# Patient Record
Sex: MALE | Race: WHITE | Employment: UNEMPLOYED | URBAN - METROPOLITAN AREA
[De-identification: names, ages, dates, MRNs, and addresses within clinical notes are randomized per-mention and may not be internally consistent; named-entity substitution may affect disease eponyms.]

---
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
## 2018-08-16 ENCOUNTER — OFFICE VISIT (OUTPATIENT)
Dept: FAMILY MEDICINE CLINIC | Age: 18
End: 2018-08-16

## 2018-08-16 VITALS
TEMPERATURE: 98.8 F | DIASTOLIC BLOOD PRESSURE: 63 MMHG | RESPIRATION RATE: 16 BRPM | HEIGHT: 62 IN | OXYGEN SATURATION: 100 % | HEART RATE: 86 BPM | SYSTOLIC BLOOD PRESSURE: 112 MMHG

## 2018-08-16 DIAGNOSIS — F82 MOTOR DEVELOPMENTAL DELAY: ICD-10-CM

## 2018-08-16 DIAGNOSIS — Q05.2 SPINA BIFIDA OF LUMBAR REGION WITH HYDROCEPHALUS (HCC): ICD-10-CM

## 2018-08-16 DIAGNOSIS — G82.20 PARAPARESIS (HCC): ICD-10-CM

## 2018-08-16 DIAGNOSIS — N31.9 NEUROGENIC BLADDER: ICD-10-CM

## 2018-08-16 DIAGNOSIS — Z98.2 VP (VENTRICULOPERITONEAL) SHUNT STATUS: ICD-10-CM

## 2018-08-16 DIAGNOSIS — R41.844 EXECUTIVE FUNCTION DEFICIT: ICD-10-CM

## 2018-08-16 DIAGNOSIS — F89 NEURODEVELOPMENTAL DISORDER: Primary | ICD-10-CM

## 2018-08-16 DIAGNOSIS — R41.3 MEMORY PROBLEM: ICD-10-CM

## 2018-08-16 RX ORDER — TOLTERODINE 2 MG/1
CAPSULE, EXTENDED RELEASE ORAL
COMMUNITY
Start: 2018-08-15 | End: 2020-02-04

## 2018-08-16 NOTE — PROGRESS NOTES
Kushal Yanez [address], [address]  [phone]             Date of visit: 8/16/2018   Subjective:      History obtained from:  Patient and mother  . Analia Wagner is a 19 y.o. male who presents today for just needs form filled out, see scanned  Going to community college and asking for academic accommodations  Had neuropsych testing in 2014, see scanned  Recommended various help for his schooling    Dx neurodevelopmental disorder, fine motor delay, memory problem, executive functioning problem. When he took SAT had 1.5x and someone reading the exam aloud  Does think he does better with auditory than reading  Really motivated to lean and work hard  Still playing 3 sports (basketball, rowing, lacrosse) for special olympics but not needing form for that  Feeling well overall, no new concerns.   Has not had emotional problems now or in the past    PHQ over the last two weeks 8/16/2018   Little interest or pleasure in doing things Not at all   Feeling down, depressed, irritable, or hopeless Not at all   Total Score PHQ 2 0        Patient Active Problem List    Diagnosis Date Noted    Neurodevelopmental disorder 08/16/2018    Motor developmental delay 08/16/2018    Memory problem 08/16/2018    Executive function deficit 08/16/2018    Paraparesis ([facility] [address] 07/28/2017    Neurogenic bladder 07/28/2017    Urinary incontinence 07/28/2017    Self-catheterizes urinary bladder 07/28/2017    Flexion contracture of knee 07/28/2017     (ventriculoperitoneal) shunt status 07/28/2017    Vitamin D deficiency 07/28/2017    Mild intermittent asthma without complication 56/24/6256    Spina bifida ([facility] [address] 08/05/2014     Current Outpatient Prescriptions   Medication Sig Dispense Refill    tolterodine ER (DETROL-LA) 2 mg ER capsule       albuterol (PROVENTIL HFA, VENTOLIN HFA, PROAIR HFA) 90 mcg/actuation inhaler 2 Puffs Q4 hours scheduled x 48 hours then Q4 hours PRN 1 Inhaler 3    budesonide (PULMICORT) 180 mcg/actuation aepb inhaler Take 2 Puffs by inhalation two (2) times a day. 1 Each 2    pedi multivitamin no. 79-iron (FLINTSTONES WITH IRON) 18 mg iron chew Take 2 Tabs by mouth daily. 100 Tab 3    oxybutynin (OXYTROL) 3.9 mg/24 hr transdermal patch 1 Patch by TransDERmal route two (2) days a week. Allergies   Allergen Reactions    Latex Hives    Banana Other (comments)     ASSUMED DUE TO LATEX ALLERGY    Kiwi Other (comments)     ASSUMED DUE TO LATEX ALLERGY    [place] Other (comments)     ASSUMED DUE TO LATEX ALLERGY     Past Medical History:   Diagnosis Date    Asthma attack 4/29/2016    Chiari malformation     Hydrocephalus     Ill-defined condition     spina bifida    Spina bifida ([facility] [address]      Past Surgical History:   Procedure Laterality Date    HX BACK SURGERY  LAST: 2006  3 SURGERIES    TETHERED CORD RELEASE    HX CSF SHUNT Left 2010    has had at least 12    HX ORTHOPAEDIC      bilateral knee flexion deformity release    HX SPINAL CORD DECOMPRESSION N/A 2004    Neck     Family History   Problem Relation Age of Onset    Cancer Paternal Grandfather     Thyroid Disease Mother 46     5/2018    Hypertension Father     Elevated Lipids Father     Anesth Problems Neg Hx      Social History   Substance Use Topics    Smoking status: Never Smoker    Smokeless tobacco: Never Used    Alcohol use No      Social History     Social History Narrative    Lives with mom and older brother, who also has spina bifida    Mom homeschooled his last few years; he earned high school diploma             Objective:     Vitals:    08/16/18 1524   BP: 112/63   Pulse: 86   Resp: 16   Temp: 98.8 °F (37.1 °C)   TempSrc: Oral   SpO2: 100%   Height: 5' 2\" (1.575 m)     Did not weigh patient today; no suitable scale for wheelchair  Appears healthy not overweight.     General: stated age, appears well, in wheelchair with atrophy of leg muscles  Skin:  No lesions noted  Psych: alert and oriented to person, place, time and situation and Speech: appropriate quality, quantity and organization of sentences. Normal affect      Assessment/Plan:       ICD-10-CM ICD-9-CM    1. Neurodevelopmental disorder F89 315.9    2. Executive function deficit R41.844 799.55    3. Memory problem R41.3 780.93    4. Motor developmental delay F82 315.4    5. Spina bifida of lumbar region with hydrocephalus ([facility]) Q05.2 741.03    6. Paraparesis ([facility]) G82.20 344.1    7. Neurogenic bladder N31.9 596.54    8.  (ventriculoperitoneal) shunt status Z98.2 V45.2         Orders Placed This Encounter    tolterodine ER (DETROL-LA) 2 mg ER capsule     Filled out forms, see scanned  Very impressed he is going to college; will be extra challenges for him, but he is up for them  Asked them to let me know if he needs letter for other/more accommodations. Filled out form based on recommendations from neuropsych testing    Discussed the diagnosis and plan and he expressed understanding. Follow-up Disposition:  Return in about 1 year (around 8/16/2019).     Julianna Runner, MD

## 2018-08-16 NOTE — PROGRESS NOTES
Chief Complaint   Patient presents with    Complete Physical     school physical - ARISTIDES Merino      1. Have you been to the ER, urgent care clinic since your last visit? Hospitalized since your last visit? No    2. Have you seen or consulted any other health care providers outside of the [address] [place] [address] since your last visit? Include any pap smears or colon screening.    Yes Dr Fredrick Hilliard Neuro          Dr Ashley Hadley - Urologist

## 2018-10-16 ENCOUNTER — PATIENT MESSAGE (OUTPATIENT)
Dept: FAMILY MEDICINE CLINIC | Age: 18
End: 2018-10-16

## 2018-10-16 DIAGNOSIS — N39.490 OVERFLOW INCONTINENCE OF URINE: ICD-10-CM

## 2018-10-16 DIAGNOSIS — Z78.9 SELF-CATHETERIZES URINARY BLADDER: ICD-10-CM

## 2018-10-16 DIAGNOSIS — N31.9 NEUROGENIC BLADDER: ICD-10-CM

## 2018-10-16 DIAGNOSIS — R41.844 EXECUTIVE FUNCTION DEFICIT: ICD-10-CM

## 2018-10-16 DIAGNOSIS — Z98.2 VP (VENTRICULOPERITONEAL) SHUNT STATUS: ICD-10-CM

## 2018-10-16 DIAGNOSIS — R41.3 MEMORY PROBLEM: ICD-10-CM

## 2018-10-16 DIAGNOSIS — F89 NEURODEVELOPMENTAL DISORDER: ICD-10-CM

## 2018-10-16 DIAGNOSIS — G82.20 PARAPARESIS (HCC): ICD-10-CM

## 2018-10-16 DIAGNOSIS — M24.569 FLEXION CONTRACTURE OF KNEE, UNSPECIFIED LATERALITY: ICD-10-CM

## 2018-10-16 DIAGNOSIS — F82 MOTOR DEVELOPMENTAL DELAY: ICD-10-CM

## 2018-10-16 DIAGNOSIS — Q05.2 SPINA BIFIDA OF LUMBAR REGION WITH HYDROCEPHALUS (HCC): Primary | ICD-10-CM

## 2018-10-16 NOTE — LETTER
NOTIFICATION  
10/16/2018 Mr. Waldemar Ibanez [facility] [address]  [address] [phone] To Whom It May Concern: 
 
Waldemar Ibanez is currently under the care of BARRON Jacobs 53. He has the following health conditions: ICD-10-CM ICD-9-CM 1. Spina bifida of lumbar region with hydrocephalus ([facility] [address] Q05.2 741.03   
2. Paraparesis ([facility]) G82.20 344.1 3. Neurogenic bladder N31.9 596.54   
4. Self-catheterizes urinary bladder Z78.9 V49.89   
5. Overflow incontinence of urine N39.490 788.38   
6.  (ventriculoperitoneal) shunt status Z98.2 V45.2 7. Flexion contracture of knee, unspecified laterality M24.569 718.46   
8. Neurodevelopmental disorder F89 315.9 9. Motor developmental delay F82 315.4 10. Memory problem R41.3 780.93   
11. Executive function deficit R41.844 799.55 He has lower extremity disability and is limited to a wheelchair, as he can barely move his lower extremities due to severe weakness from spina bifida. He has a mild memory/executive function problem for which he needs accommodations at school. He has to self-catheterize several times daily due to neurogenic bladder. If there are questions or concerns please contact our office. Sincerely, Mathew Street MD

## 2018-10-16 NOTE — TELEPHONE ENCOUNTER
Dr. Roberth Dominguez to go about this in such a manner. Just thought it would be easiest.     Chelsie Bhatti is filling out a juan application for [email or web address] in order to help with travel expenses for basketball, lacrosse and rowing. He needs a letter of medical verification that states he has lower limb disability.  This letter/document must include a diagnosis of your physical disability by a medical professional. The document should indicate your physical limitations as a result of a medical diagnosis. Is this something you could you please do for him and send to me? (I don't think he has mychart)    Thanks so much!!  Myron Byrd wrote her back that I would mail the letter.  Shanita Her MD 10/16/2018 7:37 AM

## 2018-11-01 ENCOUNTER — OFFICE VISIT (OUTPATIENT)
Dept: FAMILY MEDICINE CLINIC | Age: 18
End: 2018-11-01

## 2018-11-01 VITALS
HEIGHT: 62 IN | TEMPERATURE: 98.7 F | OXYGEN SATURATION: 98 % | HEART RATE: 83 BPM | RESPIRATION RATE: 18 BRPM | SYSTOLIC BLOOD PRESSURE: 120 MMHG | DIASTOLIC BLOOD PRESSURE: 68 MMHG

## 2018-11-01 DIAGNOSIS — R06.2 WHEEZING: ICD-10-CM

## 2018-11-01 DIAGNOSIS — J45.20 MILD INTERMITTENT ASTHMA WITHOUT COMPLICATION: ICD-10-CM

## 2018-11-01 RX ORDER — ALBUTEROL SULFATE 90 UG/1
AEROSOL, METERED RESPIRATORY (INHALATION)
Qty: 1 INHALER | Refills: 3 | Status: SHIPPED | OUTPATIENT
Start: 2018-11-01 | End: 2022-09-22 | Stop reason: SDUPTHER

## 2018-11-01 NOTE — PROGRESS NOTES
[facility]ushal Yanez [facility]  [id number] [place] [address] [address] [place]  [phone]             Date of visit: 11/1/2018   Subjective:      History obtained from:  Patient, mother. Arti Kaba is a 19 y.o. male who presents today for asthma worse in past month  Needing rescue inhaler sometimes during basketball practice, sometimes feels like he needs it  No sob, just wheezing, cough. The albuterol helps  Only using his pulmicort once daily    Hasn't had a cold or allergies  When he was younger cold weather would bring it on  Thinks this started when the weather got cold  Not exposed to smoke      Asthma started around age 1  They don't recall him having PFTs  Sort of outgrew it for a while, has come back but not as bad as it used to be    Patient Active Problem List    Diagnosis Date Noted    Neurodevelopmental disorder 08/16/2018    Motor developmental delay 08/16/2018    Memory problem 08/16/2018    Executive function deficit 08/16/2018    Paraparesis ([facility] [address] 07/28/2017    Neurogenic bladder 07/28/2017    Urinary incontinence 07/28/2017    Self-catheterizes urinary bladder 07/28/2017    Flexion contracture of knee 07/28/2017     (ventriculoperitoneal) shunt status 07/28/2017    Vitamin D deficiency 07/28/2017    Mild intermittent asthma without complication 68/84/4124    Spina bifida ([facility] [address] 08/05/2014     Current Outpatient Medications   Medication Sig Dispense Refill    albuterol (PROVENTIL HFA, VENTOLIN HFA, PROAIR HFA) 90 mcg/actuation inhaler 2 Puffs Q4 hours scheduled x 48 hours then Q4 hours PRN 1 Inhaler 3    tolterodine ER (DETROL-LA) 2 mg ER capsule       budesonide (PULMICORT) 180 mcg/actuation aepb inhaler Take 2 Puffs by inhalation two (2) times a day.  1 Each 2     Allergies   Allergen Reactions    Latex Hives    Banana Other (comments)     ASSUMED DUE TO LATEX ALLERGY    Kiwi Other (comments)     ASSUMED DUE TO LATEX ALLERGY    [place] Other (comments) ASSUMED DUE TO LATEX ALLERGY     Past Medical History:   Diagnosis Date    Asthma attack 4/29/2016    Chiari malformation     Hydrocephalus     Ill-defined condition     spina bifida    Spina bifida ([facility] [address]      Past Surgical History:   Procedure Laterality Date    HX BACK SURGERY  LAST: 2006  3 SURGERIES    TETHERED CORD RELEASE    HX CSF SHUNT Left 2010    has had at least 12    HX ORTHOPAEDIC      bilateral knee flexion deformity release    HX SPINAL CORD DECOMPRESSION N/A 2004    Neck     Family History   Problem Relation Age of Onset    Cancer Paternal Grandfather     Thyroid Disease Mother 46        5/2018    Hypertension Father     Elevated Lipids Father     Anesth Problems Neg Hx      Social History     Tobacco Use    Smoking status: Never Smoker    Smokeless tobacco: Never Used   Substance Use Topics    Alcohol use: No      Social History     Social History Narrative    Lives with mom and older brother, who also has spina bifida    Mom homeschooled his last few years; he earned high school diploma        Review of Systems  Gen: denies fever  ENT denies runny nose  All: denies itchy eyes         Objective:     Vitals:    11/01/18 1532   BP: 120/68   Pulse: 83   Resp: 18   Temp: 98.7 °F (37.1 °C)   SpO2: 98%   Height: 5' 2\" (1.575 m)     There is no height or weight on file to calculate BMI.      General: stated age, well nourished, and in NAD, in wheelchair  Neck: supple, symmetrical, trachea midline, no adenopathy and thyroid: not enlarged, symmetric, no tenderness/mass/nodules  Nose: no drainage, turbinates normal  Throat: clear, no tonsillar exudate or erthema  Mouth: no lesions noted  Lungs:  clear to auscultation w/o rales, rhonchi, wheezes w/normal effort and no use of accessory muscles of respiration   Heart: regular rate and rhythm, S1, S2 normal, no murmur, click, rub or gallop  Lymph: no cervical adenopathy appreciated  Ext: no edema noted, in braces  Skin:  No rashes or lesions seen    Assessment/Plan:       ICD-10-CM ICD-9-CM    1. Wheezing R06.2 786.07 albuterol (PROVENTIL HFA, VENTOLIN HFA, PROAIR HFA) 90 mcg/actuation inhaler   2. Mild intermittent asthma without complication P00.92 403.71 albuterol (PROVENTIL HFA, VENTOLIN HFA, PROAIR HFA) 90 mcg/actuation inhaler        Orders Placed This Encounter    albuterol (PROVENTIL HFA, VENTOLIN HFA, PROAIR HFA) 90 mcg/actuation inhaler       Advised increase pulmicort to BID during winter months, he knows to rinse mouth  Use albuterol every 4 hours for a few days until pulmicort kicks in  If not much better in a week will do PFTs  Doesn't really seem exacerbated bad enough to do oral steroids, he just typically has these symtpoms in the colder months  If doing well when warmer again can go back to once daily pulmicort  Advised to avoid smoke  Use hand  to prevent URIs  Declines vaccines    Discussed the diagnosis and plan and he expressed understanding. Follow-up Disposition:  Return if symptoms worsen or fail to improve.     Bill Victor MD

## 2018-11-01 NOTE — PROGRESS NOTES
Chief Complaint   Patient presents with    Asthma     follow up, states has not been under good control. Unable to weigh. Reviewed Record in preparation for visit and have obtained necessary documentation. Identified pt with two pt identifiers (Name @ )    Health Maintenance Due   Topic    HPV Age 9Y-34Y (1 - Male 3-dose series)    Pneumococcal 19-64 Medium Risk (1 of 1 - PPSV23)         1. Have you been to the ER, urgent care clinic since your last visit? Hospitalized since your last visit? No    2. Have you seen or consulted any other health care providers outside of the [address] [place] [address] since your last visit? Include any pap smears or colon screening.  No

## 2018-11-01 NOTE — PATIENT INSTRUCTIONS
Asthma in Adults: Care Instructions  Your Care Instructions    During an asthma attack, your airways swell and narrow as a reaction to certain things (triggers). This makes it hard to breathe. You may be able to prevent asthma attacks if you avoid the things that set off your asthma symptoms. Keeping your asthma under control and treating symptoms before they get bad can help you avoid severe attacks. If you can control your asthma, you may be able to do all of your normal daily activities. You may also avoid asthma attacks and trips to the hospital.  Follow-up care is a key part of your treatment and safety. Be sure to make and go to all appointments, and call your doctor if you are having problems. It's also a good idea to know your test results and keep a list of the medicines you take. How can you care for yourself at home? · Follow your asthma action plan so you can manage your symptoms at home. An asthma action plan will help you prevent and control airway reactions and will tell you what to do during an asthma attack. If you do not have an asthma action plan, work with your doctor to build one. · Take your asthma medicine exactly as prescribed. Medicine plays an important role in controlling asthma. Talk to your doctor right away if you have any questions about what to take and how to take it. ? Use your quick-relief medicine when you have symptoms of an attack. Quick-relief medicine often is an albuterol inhaler. Some people need to use quick-relief medicine before they exercise. ? Take your controller medicine every day, not just when you have symptoms. Controller medicine is usually an inhaled corticosteroid. The goal is to prevent problems before they occur. Do not use your controller medicine to try to treat an attack that has already started. It does not work fast enough to help. ? If your doctor prescribed corticosteroid pills to use during an attack, take them as directed.  They may take hours to work, but they may shorten the attack and help you breathe better. ? Keep your quick-relief medicine with you at all times. · Talk to your doctor before using other medicines. Some medicines, such as aspirin, can cause asthma attacks in some people. · Check yourself for asthma symptoms to know which step to follow in your action plan. Watch for things like being short of breath, having chest tightness, coughing, and wheezing. Also notice if symptoms wake you up at night or if you get tired quickly when you exercise. · If you have a peak flow meter, use it to check how well you are breathing. This can help you predict when an asthma attack is going to occur. Then you can take medicine to prevent the asthma attack or make it less severe. · See your doctor regularly. These visits will help you learn more about asthma and what you can do to control it. Your doctor will monitor your treatment to make sure the medicine is helping you. · Keep track of your asthma attacks and your treatment. After you have had an attack, write down what triggered it, what helped end it, and any concerns you have about your asthma action plan. Take your diary when you see your doctor. You can then review your asthma action plan and decide if it is working. · Do not smoke or allow others to smoke around you. Avoid smoky places. Smoking makes asthma worse. If you need help quitting, talk to your doctor about stop-smoking programs and medicines. These can increase your chances of quitting for good. · Learn what triggers an asthma attack for you, and avoid the triggers when you can. Common triggers include colds, smoke, air pollution, dust, pollen, mold, pets, cockroaches, stress, and cold air. · Avoid colds and the flu. Get a pneumococcal vaccine shot. If you have had one before, ask your doctor whether you need a second dose. Get a flu vaccine every fall.  If you must be around people with colds or the flu, wash your hands often.  When should you call for help? Call 911 anytime you think you may need emergency care. For example, call if:    · You have severe trouble breathing.    Call your doctor now or seek immediate medical care if:    · Your symptoms do not get better after you have followed your asthma action plan.     · You cough up yellow, dark brown, or bloody mucus (sputum).    Watch closely for changes in your health, and be sure to contact your doctor if:    · Your coughing and wheezing get worse.     · You need to use quick-relief medicine on more than 2 days a week (unless it is just for exercise).     · You need help figuring out what is triggering your asthma attacks. Where can you learn more? Go to http://chaparrita-georgie.info/. Enter P597 in the search box to learn more about \"Asthma in Adults: Care Instructions. \"  Current as of: December 6, 2017  Content Version: 11.8  © 0527-6756 [employer]. Care instructions adapted under license by [employer] (which disclaims liability or warranty for this information). If you have questions about a medical condition or this instruction, always ask your healthcare professional. [place] [address] any warranty or liability for your use of this information.

## 2019-06-13 NOTE — PROGRESS NOTES
Mom was here, they are traveling to [facility] for an athletic conference, needs hep a and tyhpoid, will get at pharmacy, sent in rx's

## 2019-06-18 ENCOUNTER — OFFICE VISIT (OUTPATIENT)
Dept: FAMILY MEDICINE CLINIC | Age: 19
End: 2019-06-18

## 2019-06-18 VITALS
TEMPERATURE: 99.9 F | DIASTOLIC BLOOD PRESSURE: 71 MMHG | RESPIRATION RATE: 18 BRPM | HEIGHT: 62 IN | OXYGEN SATURATION: 98 % | SYSTOLIC BLOOD PRESSURE: 125 MMHG | HEART RATE: 103 BPM

## 2019-06-18 DIAGNOSIS — Z23 ENCOUNTER FOR IMMUNIZATION: Primary | ICD-10-CM

## 2019-06-18 NOTE — PROGRESS NOTES
Chief Complaint   Patient presents with    Immunization/Injection     patient is traveling out of country to [facility] needs Hep A vaccine, unable to get at pharmacy due to ins.        1. Have you been to the ER, urgent care clinic since your last visit? Hospitalized since your last visit? No    2. Have you seen or consulted any other health care providers outside of the [address] [place] [address] since your last visit? Include any pap smears or colon screening.  No

## 2019-08-14 ENCOUNTER — TELEPHONE (OUTPATIENT)
Dept: FAMILY MEDICINE CLINIC | Age: 19
End: 2019-08-14

## 2019-08-14 NOTE — TELEPHONE ENCOUNTER
LVM for return call  Spoke to [address] and she states that they are going out of the country. Pt has a latex allergy. He has an epi pen now but it is way out of date. She wanted to have a new one.   [facility] pharmacy

## 2019-08-14 NOTE — TELEPHONE ENCOUNTER
Katelynn Noriega (on [address]) is calling wanting to know if Dr. Alexsander Rolle could write a script for Epi-pen for the patient. Katelynn Noriega stated they are going out of the country in October and will need it.          Best callback:  [phone]      LOV:  Tuesday, June 18, 2019

## 2019-09-20 ENCOUNTER — TELEPHONE (OUTPATIENT)
Dept: FAMILY MEDICINE CLINIC | Age: 19
End: 2019-09-20

## 2019-09-20 RX ORDER — EPINEPHRINE 0.3 MG/.3ML
0.3 INJECTION SUBCUTANEOUS
Qty: 2 SYRINGE | Refills: 2 | Status: SHIPPED | OUTPATIENT
Start: 2019-09-20 | End: 2019-09-20

## 2020-02-04 ENCOUNTER — OFFICE VISIT (OUTPATIENT)
Dept: FAMILY MEDICINE CLINIC | Age: 20
End: 2020-02-04

## 2020-02-04 VITALS
SYSTOLIC BLOOD PRESSURE: 131 MMHG | OXYGEN SATURATION: 100 % | DIASTOLIC BLOOD PRESSURE: 71 MMHG | HEART RATE: 99 BPM | TEMPERATURE: 98 F | HEIGHT: 62 IN | RESPIRATION RATE: 17 BRPM

## 2020-02-04 DIAGNOSIS — Z11.59 ENCOUNTER FOR HEPATITIS C SCREENING TEST FOR LOW RISK PATIENT: ICD-10-CM

## 2020-02-04 DIAGNOSIS — Z23 ENCOUNTER FOR IMMUNIZATION: ICD-10-CM

## 2020-02-04 DIAGNOSIS — Z13.220 ENCOUNTER FOR LIPID SCREENING FOR CARDIOVASCULAR DISEASE: ICD-10-CM

## 2020-02-04 DIAGNOSIS — Q05.2 SPINA BIFIDA OF LUMBAR REGION WITH HYDROCEPHALUS (HCC): ICD-10-CM

## 2020-02-04 DIAGNOSIS — Z98.2 VP (VENTRICULOPERITONEAL) SHUNT STATUS: ICD-10-CM

## 2020-02-04 DIAGNOSIS — G82.20 PARAPARESIS (HCC): ICD-10-CM

## 2020-02-04 DIAGNOSIS — J45.20 MILD INTERMITTENT ASTHMA WITHOUT COMPLICATION: ICD-10-CM

## 2020-02-04 DIAGNOSIS — B37.0 THRUSH: Primary | ICD-10-CM

## 2020-02-04 DIAGNOSIS — E55.9 VITAMIN D DEFICIENCY: ICD-10-CM

## 2020-02-04 DIAGNOSIS — N31.9 NEUROGENIC BLADDER: ICD-10-CM

## 2020-02-04 DIAGNOSIS — Z13.6 ENCOUNTER FOR LIPID SCREENING FOR CARDIOVASCULAR DISEASE: ICD-10-CM

## 2020-02-04 DIAGNOSIS — Z11.4 ENCOUNTER FOR SCREENING FOR HIV: ICD-10-CM

## 2020-02-04 RX ORDER — NYSTATIN 100000 [USP'U]/ML
1 SUSPENSION ORAL 4 TIMES DAILY
Qty: 473 ML | Refills: 2 | Status: SHIPPED | OUTPATIENT
Start: 2020-02-04 | End: 2021-05-24

## 2020-02-04 RX ORDER — FESOTERODINE FUMARATE 8 MG/1
TABLET, FILM COATED, EXTENDED RELEASE ORAL
COMMUNITY
Start: 2019-10-29 | End: 2022-09-22

## 2020-02-04 NOTE — PROGRESS NOTES
Kushal Yanez [facility]  [place]. [address], [address]  [phone]             Date of visit: 2/4/2020   Subjective:      History obtained from:  the patient, father  Analia Wagner is a 22 y.o. male who presents today for pain under tongue started yesterday AM  Mom noticed white spots under there today  Feeling well otherwise  No difficulty swallowing  No changes in meds      Still getting exercise  Plays sports  Most weeks    Does need to eat more veggies  Thinks weight is stable    Asthma not bothering him  Off flovent for a year and not missing it  Rarely needs albuterol, only when he plays sports and can use it prior to playing    Would be interested in getting his last hep A shot (got first one prior to travel last year)  Will not get the flu shot; mom had bad experience. Encouraged him to consider it    Patient Active Problem List    Diagnosis Date Noted    Neurodevelopmental disorder 08/16/2018    Motor developmental delay 08/16/2018    Memory problem 08/16/2018    Executive function deficit 08/16/2018    Paraparesis ([facility] 07/28/2017    Neurogenic bladder 07/28/2017    Urinary incontinence 07/28/2017    Self-catheterizes urinary bladder 07/28/2017    Flexion contracture of knee 07/28/2017     (ventriculoperitoneal) shunt status 07/28/2017    Vitamin D deficiency 07/28/2017    Mild intermittent asthma without complication 77/42/1115    Spina bifida ([facility] 08/05/2014     Current Outpatient Medications   Medication Sig Dispense Refill    TOVIAZ 8 mg ER tablet       nystatin (MYCOSTATIN) 100,000 unit/mL suspension Take 5 mL by mouth four (4) times daily. swish and swallow 473 mL 2    albuterol (PROVENTIL HFA, VENTOLIN HFA, PROAIR HFA) 90 mcg/actuation inhaler 2 Puffs Q4 hours scheduled x 48 hours then Q4 hours PRN 1 Inhaler 3    budesonide (PULMICORT) 180 mcg/actuation aepb inhaler Take 2 Puffs by inhalation two (2) times a day.  1 Each 2     Allergies Allergen Reactions    Latex Hives    Banana Other (comments)     ASSUMED DUE TO LATEX ALLERGY    Kiwi Other (comments)     ASSUMED DUE TO LATEX ALLERGY    Boyd Other (comments)     ASSUMED DUE TO LATEX ALLERGY     Past Medical History:   Diagnosis Date    Asthma attack 4/29/2016    Chiari malformation     [facility]cephalus ([facility] [facility]     Ill-defined condition     spina bifida    Spina bifida ([facility]      Past Surgical History:   Procedure Laterality Date    HX BACK SURGERY  LAST: 2006  3 SURGERIES    TETHERED CORD RELEASE    HX CSF SHUNT Left 2010    has had at least 12    HX ORTHOPAEDIC      bilateral knee flexion deformity release    HX SPINAL CORD DECOMPRESSION N/A 2004    Neck     Family History   Problem Relation Age of Onset    Cancer Paternal Grandfather     Thyroid Disease Mother 46        5/2018    Hypertension Father     Elevated Lipids Father     Anesth Problems Neg Hx      Social History     Tobacco Use    Smoking status: Never Smoker    Smokeless tobacco: Never Used   Substance Use Topics    Alcohol use: No      Social History     Patient does not qualify to have social determinant information on file (likely too young). Social History Narrative    Lives with mom and older brother, who also has spina bifida    Mom homeschooled his last few years; he earned high school diploma        Review of Systems  Gen: denies fever  GI denies constipation   denies urinary pain (still self-caths)         Objective:     Vitals:    02/04/20 1604   BP: 131/71   Pulse: 99   Resp: 17   Temp: 98 °F (36.7 °C)   TempSrc: Oral   SpO2: 100%   Height: 5' 2\" (1.575 m)     There is no height or weight on file to calculate BMI.      General: stated age, well nourished, and in NAD, in wheelchair  Neck: supple, symmetrical, trachea midline, no adenopathy and thyroid: not enlarged, symmetric, no tenderness/mass/nodules  Nose: no drainage, turbinates normal  Throat: clear, no tonsillar exudate or erthema  Mouth: tongue, throat, and inner cheeks normal but has white stuck-on plaques under tongue  Lungs:  clear to auscultation w/o rales, rhonchi, wheezes w/normal effort and no use of accessory muscles of respiration   Heart: regular rate and rhythm, S1, S2 normal, no murmur, click, rub or gallop  Lymph: no cervical adenopathy appreciated  Abd: soft, nontender  Ext: no edema  Skin:  No rashes or lesions     Assessment/Plan:       ICD-10-CM ICD-9-CM    1. Thrush B37.0 112.0 HEMOGLOBIN A1C WITH EAG      TSH 3RD GENERATION   2. Neurogenic bladder N31.9 596.54 CBC WITH AUTOMATED DIFF      METABOLIC PANEL, COMPREHENSIVE      TSH 3RD GENERATION   3. Spina bifida of lumbar region with hydrocephalus (HCC) Q05.2 741.03    4. Paraparesis (HCC) G82.20 344.1    5. Mild intermittent asthma without complication V63.38 898.15    6. Vitamin D deficiency E55.9 268.9 VITAMIN D, 25 HYDROXY   7.  (ventriculoperitoneal) shunt status Z98.2 V45.2    8. Encounter for screening for HIV Z11.4 V73.89 HIV 1/2 AG/AB, 4TH GENERATION,W RFLX CONFIRM   9. Encounter for hepatitis C screening test for low risk patient Z11.59 V73.89 HEPATITIS C AB   10. Encounter for lipid screening for cardiovascular disease Z13.220 V77.91 LIPID PANEL    Z13.6 V81.2    11.  Encounter for immunization Z23 V03.89 HEPATITIS A VACCINE, ADULT DOSAGE, IM        Orders Placed This Encounter    Hepatitis A vaccine, adult dosage, IM    CBC WITH AUTOMATED DIFF    METABOLIC PANEL, COMPREHENSIVE    LIPID PANEL    VITAMIN D, 25 HYDROXY    HIV 1/2 AG/AB, 4TH GENERATION,W RFLX CONFIRM    HEPATITIS C AB    HEMOGLOBIN A1C WITH EAG    TSH 3RD GENERATION    TOVIAZ 8 mg ER tablet    nystatin (MYCOSTATIN) 100,000 unit/mL suspension     First episode thrush and not on meds to cause it   Labs as above  Routine nystatin treatment, continue until thrush gone for 2 full days  Other chronic problems stable, no med changes  encouraged diet improvements, more veggies, regular exercise    Discussed the diagnosis and plan and he expressed understanding. Follow-up and Dispositions    · Return in about 1 year (around 2/4/2021).          Maria Teresa Astudillo MD

## 2020-02-04 NOTE — PATIENT INSTRUCTIONS
Candidiasis: Care Instructions  Your Care Instructions  Candidiasis (say \"fik-nuj-CU-uh-adia\") is a yeast infection. Yeast normally lives in your body. But it can cause problems if your body's defenses don't work as they should. Some medicines can increase your chance of getting a yeast infection. These include antibiotics, steroids, and cancer drugs. And some diseases like AIDS and diabetes can make you more likely to get yeast infections. There are different types of yeast infections. Barbara Carlin is a yeast infection in the mouth. It usually occurs in people with weak immune systems. It causes white patches inside the mouth and throat. Yeast infections of the skin usually occur in skin folds where the skin stays moist. They cause red, oozing patches on your skin. Babies can get these infections under the diaper. People who often wear gloves can get them on their hands. Many women get vaginal yeast infections. They are most common when women take antibiotics. These infections can cause the vagina to itch and burn. They also cause white discharge that looks like cottage cheese. In rare cases, yeast infects the blood. This can cause serious disease. This kind of infection is treated with medicine given through a needle into a vein (IV). After you start treatment, a yeast infection usually goes away quickly. But if your immune system is weak, the infection may come back. Tell your doctor if you get yeast infections often. Follow-up care is a key part of your treatment and safety. Be sure to make and go to all appointments, and call your doctor if you are having problems. It's also a good idea to know your test results and keep a list of the medicines you take. How can you care for yourself at home? · Take your medicines exactly as prescribed. Call your doctor if you think you are having a problem with your medicine. · Use antibiotics only as directed by your doctor. · Eat yogurt with live cultures.  It has bacteria called lactobacillus. It may help prevent some types of yeast infections. · Keep your skin clean and dry. Put powder on moist places. · If you are using a cream or suppository to treat a vaginal yeast infection, don't use condoms or a diaphragm. Use a different type of birth control. · Eat a healthy diet and get regular exercise. This will help keep your immune system strong. When should you call for help? Watch closely for changes in your health, and be sure to contact your doctor if:    · You do not get better as expected. Where can you learn more? Go to http://chaparrita-georgie.info/. Enter M985 in the search box to learn more about \"Candidiasis: Care Instructions. \"  Current as of: February 19, 2019  Content Version: 12.2  © 4531-5797 [employer]o. Care instructions adapted under license by [employer] (which disclaims liability or warranty for this information). If you have questions about a medical condition or this instruction, always ask your healthcare professional. [address] any warranty or liability for your use of this information.

## 2020-02-04 NOTE — PROGRESS NOTES
Chief Complaint   Patient presents with    Tongue Swelling     noticed some pain in his tongue yesterday then mother saw white spots this am        1. Have you been to the ER, urgent care clinic since your last visit? Hospitalized since your last visit? No    2. Have you seen or consulted any other health care providers outside of the [address] [place] [address] since your last visit? Include any pap smears or colon screening.  No

## 2020-02-05 LAB
25(OH)D3+25(OH)D2 SERPL-MCNC: 14.6 NG/ML (ref 30–100)
ALBUMIN SERPL-MCNC: 4.7 G/DL (ref 4.1–5.2)
ALBUMIN/GLOB SERPL: 2.4 {RATIO} (ref 1.2–2.2)
ALP SERPL-CCNC: 86 IU/L (ref 39–117)
ALT SERPL-CCNC: 23 IU/L (ref 0–44)
AST SERPL-CCNC: 17 IU/L (ref 0–40)
BASOPHILS # BLD AUTO: 0.1 X10E3/UL (ref 0–0.2)
BASOPHILS NFR BLD AUTO: 1 %
BILIRUB SERPL-MCNC: 1.3 MG/DL (ref 0–1.2)
BUN SERPL-MCNC: 7 MG/DL (ref 6–20)
BUN/CREAT SERPL: 12 (ref 9–20)
CALCIUM SERPL-MCNC: 9.4 MG/DL (ref 8.7–10.2)
CHLORIDE SERPL-SCNC: 100 MMOL/L (ref 96–106)
CHOLEST SERPL-MCNC: 151 MG/DL (ref 100–199)
CO2 SERPL-SCNC: 23 MMOL/L (ref 20–29)
CREAT SERPL-MCNC: 0.6 MG/DL (ref 0.76–1.27)
EOSINOPHIL # BLD AUTO: 0.1 X10E3/UL (ref 0–0.4)
EOSINOPHIL NFR BLD AUTO: 1 %
ERYTHROCYTE [DISTWIDTH] IN BLOOD BY AUTOMATED COUNT: 13.6 % (ref 11.6–15.4)
EST. AVERAGE GLUCOSE BLD GHB EST-MCNC: 97 MG/DL
GLOBULIN SER CALC-MCNC: 2 G/DL (ref 1.5–4.5)
GLUCOSE SERPL-MCNC: 92 MG/DL (ref 65–99)
HBA1C MFR BLD: 5 % (ref 4.8–5.6)
HCT VFR BLD AUTO: 45.5 % (ref 37.5–51)
HCV AB S/CO SERPL IA: <0.1 S/CO RATIO (ref 0–0.9)
HDLC SERPL-MCNC: 48 MG/DL
HGB BLD-MCNC: 15.2 G/DL (ref 13–17.7)
HIV 1+2 AB+HIV1 P24 AG SERPL QL IA: NON REACTIVE
IMM GRANULOCYTES # BLD AUTO: 0 X10E3/UL (ref 0–0.1)
IMM GRANULOCYTES NFR BLD AUTO: 0 %
INTERPRETATION, 910389: NORMAL
LDLC SERPL CALC-MCNC: 88 MG/DL (ref 0–99)
LYMPHOCYTES # BLD AUTO: 1 X10E3/UL (ref 0.7–3.1)
LYMPHOCYTES NFR BLD AUTO: 17 %
MCH RBC QN AUTO: 26.1 PG (ref 26.6–33)
MCHC RBC AUTO-ENTMCNC: 33.4 G/DL (ref 31.5–35.7)
MCV RBC AUTO: 78 FL (ref 79–97)
MONOCYTES # BLD AUTO: 0.4 X10E3/UL (ref 0.1–0.9)
MONOCYTES NFR BLD AUTO: 7 %
NEUTROPHILS # BLD AUTO: 4.3 X10E3/UL (ref 1.4–7)
NEUTROPHILS NFR BLD AUTO: 74 %
PLATELET # BLD AUTO: 288 X10E3/UL (ref 150–450)
POTASSIUM SERPL-SCNC: 4.1 MMOL/L (ref 3.5–5.2)
PROT SERPL-MCNC: 6.7 G/DL (ref 6–8.5)
RBC # BLD AUTO: 5.82 X10E6/UL (ref 4.14–5.8)
SODIUM SERPL-SCNC: 139 MMOL/L (ref 134–144)
TRIGL SERPL-MCNC: 73 MG/DL (ref 0–149)
TSH SERPL DL<=0.005 MIU/L-ACNC: 1.98 UIU/ML (ref 0.45–4.5)
VLDLC SERPL CALC-MCNC: 15 MG/DL (ref 5–40)
WBC # BLD AUTO: 5.9 X10E3/UL (ref 3.4–10.8)

## 2020-02-05 RX ORDER — MELATONIN
1000 DAILY
COMMUNITY
End: 2021-05-24

## 2020-02-05 RX ORDER — ERGOCALCIFEROL 1.25 MG/1
50000 CAPSULE ORAL
Qty: 12 CAP | Refills: 0 | Status: SHIPPED | OUTPATIENT
Start: 2020-02-05 | End: 2020-04-23

## 2020-02-06 NOTE — PROGRESS NOTES
Sent in letter:  Most labs were perfect, including 2 sugar tests, thyroid, cholesterol, blood counts, kidney, liver, and electrolytes. Routine screening tests for hepatitis C and HIV were negative. Vitamin D is very low. I recommend you take the once-weekly high dose pill for 12 weeks (I will call to your pharmacy). After you finish those, you should take 1000 units per day of vitamin D3 (over the counter) for life, to keep your level up.

## 2020-12-04 ENCOUNTER — TELEPHONE (OUTPATIENT)
Dept: FAMILY MEDICINE CLINIC | Age: 20
End: 2020-12-04

## 2020-12-04 DIAGNOSIS — G82.20 PARAPARESIS (HCC): ICD-10-CM

## 2020-12-04 DIAGNOSIS — Q05.2 SPINA BIFIDA OF LUMBAR REGION WITH HYDROCEPHALUS (HCC): Primary | ICD-10-CM

## 2020-12-04 NOTE — TELEPHONE ENCOUNTER
Dad wrote note in [employer]:  Hi Doc,     We need a prescription for new wheelchair for Ronald. Can you write one? Its been 10 yrs since his last one. .. If so can you fax it to [phone]. I'm sorry to send request thru [employer] for Ronald,  tried calling but was on hold too long. If you have any questions Kari Rivas can be reached at [phone] or [email or web address]        Thanks,  Dante Perdomo send in wheelchair order

## 2021-03-04 ENCOUNTER — TRANSCRIBE ORDER (OUTPATIENT)
Dept: INTERNAL MEDICINE CLINIC | Age: 21
End: 2021-03-04

## 2021-05-24 ENCOUNTER — OFFICE VISIT (OUTPATIENT)
Dept: FAMILY MEDICINE CLINIC | Age: 21
End: 2021-05-24
Payer: COMMERCIAL

## 2021-05-24 VITALS
OXYGEN SATURATION: 98 % | BODY MASS INDEX: 24.11 KG/M2 | HEIGHT: 62 IN | DIASTOLIC BLOOD PRESSURE: 77 MMHG | RESPIRATION RATE: 18 BRPM | TEMPERATURE: 99.2 F | WEIGHT: 131 LBS | HEART RATE: 105 BPM | SYSTOLIC BLOOD PRESSURE: 127 MMHG

## 2021-05-24 DIAGNOSIS — K21.9 GASTROESOPHAGEAL REFLUX DISEASE, UNSPECIFIED WHETHER ESOPHAGITIS PRESENT: ICD-10-CM

## 2021-05-24 DIAGNOSIS — Z76.89 ENCOUNTER TO ESTABLISH CARE WITH NEW DOCTOR: Primary | ICD-10-CM

## 2021-05-24 DIAGNOSIS — Q05.2 SPINA BIFIDA OF LUMBAR REGION WITH HYDROCEPHALUS (HCC): ICD-10-CM

## 2021-05-24 DIAGNOSIS — E55.9 VITAMIN D DEFICIENCY: ICD-10-CM

## 2021-05-24 PROBLEM — F89 NEURODEVELOPMENTAL DISORDER: Status: RESOLVED | Noted: 2018-08-16 | Resolved: 2021-05-24

## 2021-05-24 PROBLEM — R41.3 MEMORY PROBLEM: Status: RESOLVED | Noted: 2018-08-16 | Resolved: 2021-05-24

## 2021-05-24 PROBLEM — R41.844 EXECUTIVE FUNCTION DEFICIT: Status: RESOLVED | Noted: 2018-08-16 | Resolved: 2021-05-24

## 2021-05-24 PROBLEM — G82.20 PARAPARESIS (HCC): Status: RESOLVED | Noted: 2017-07-28 | Resolved: 2021-05-24

## 2021-05-24 PROCEDURE — 99214 OFFICE O/P EST MOD 30 MIN: CPT | Performed by: FAMILY MEDICINE

## 2021-05-24 NOTE — PROGRESS NOTES
Chief Complaint   Patient presents with   [place].[facility] Establish Care     Acid reflux/ heartburn     1. Have you been to the ER, urgent care clinic since your last visit? Hospitalized since your last visit? No    2. Have you seen or consulted any other health care providers outside of the [address] [place] [address] since your last visit? Include any pap smears or colon screening.  No

## 2021-05-24 NOTE — PROGRESS NOTES
Patient Name: Tony Del Angel   MRN: [id number]    Kate Jackson is a 19 y.o. male who presents with the following: Transferring care from prior PCP Dr. Sandra Carver. Father is present. Reports 6-month history of intermittent heartburn. Seems to be worse with certain foods such as caffeine or spicy foods. Has not tried any medications for it. Has a history of vitamin D deficiency. Unable to swallow the pills. He will need a new wheelchair order for spina bifida. He has limited range of motion  with both legs due to the contractures. Has full range of motion and strength of his upper extremities. Review of Systems   Constitutional: Negative for fever, malaise/fatigue and weight loss. Respiratory: Negative for cough, hemoptysis, shortness of breath and wheezing. Cardiovascular: Negative for chest pain, palpitations, leg swelling and PND. Gastrointestinal: Positive for heartburn. Negative for abdominal pain, constipation, diarrhea, nausea and vomiting. The patient's medications, allergies, past medical history, surgical history, family history and social history were reviewed and updated where appropriate. Prior to Admission medications    Medication Sig Start Date End Date Taking? Authorizing Provider   TOVIAZ 8 mg ER tablet  10/29/19  Yes Provider, Historical   albuterol (PROVENTIL HFA, VENTOLIN HFA, PROAIR HFA) 90 mcg/actuation inhaler 2 Puffs Q4 hours scheduled x 48 hours then Q4 hours PRN 11/1/18  Yes Vijay Thacker MD   budesonide (PULMICORT) 180 mcg/actuation aepb inhaler Take 2 Puffs by inhalation two (2) times a day. 6/21/16  Yes Magaly Vega, GLENN   OTHER Wheelchair with pad and needed accessories for (Q05.2) Spina bifida of lumbar region with hydrocephalus, (G82.20) Paraparesis; KARTIK 99m, prog good   Fax: [phone] Ansley Bejarano.   Patient not taking: Reported on 5/24/2021 12/4/20   Yosef Alcantar MD   cholecalciferol (VITAMIN D3) (1000 Units /25 mcg) tablet Take 1 Tab by mouth daily. Patient not taking: Reported on 5/24/2021    Garret Little MD   nystatin (MYCOSTATIN) 100,000 unit/mL suspension Take 5 mL by mouth four (4) times daily.  swish and swallow  Patient not taking: Reported on 5/24/2021 2/4/20   Peyton Thacker MD       Allergies   Allergen Reactions    Latex Hives    Banana Other (comments)     ASSUMED DUE TO LATEX ALLERGY    Kiwi Other (comments)     ASSUMED DUE TO LATEX ALLERGY    Garland Other (comments)     ASSUMED DUE TO LATEX ALLERGY         Past Medical History:   Diagnosis Date    Chiari malformation     Flexion contracture of knee 7/28/2017    Hydrocephalus ([address]     Mild intermittent asthma without complication 4/66/9313    Motor developmental delay 8/16/2018    Neurogenic bladder 7/28/2017    Self-catheterizes urinary bladder 7/28/2017    Spina bifida ([address]     Urinary incontinence 7/28/2017    Vitamin D deficiency 7/28/2017     (ventriculoperitoneal) shunt status 7/28/2017       Past Surgical History:   Procedure Laterality Date    HX BACK SURGERY  LAST: 2006  3 SURGERIES    TETHERED CORD RELEASE    HX CSF SHUNT Left 2010    has had at least 12    HX ORTHOPAEDIC      bilateral knee flexion deformity release    HX SPINAL CORD DECOMPRESSION N/A 2004    Neck       Family History   Problem Relation Age of Onset    Cancer Paternal Grandfather     Thyroid Disease Mother 46        5/2018    Hypertension Father     Elevated Lipids Father     Anesth Problems Neg Hx        Social History     Socioeconomic History    Marital status: SINGLE     Spouse name: Not on file    Number of children: Not on file    Years of education: Not on file    Highest education level: Not on file   Occupational History    Not on file   Tobacco Use    Smoking status: Never Smoker    Smokeless tobacco: Never Used   Substance and Sexual Activity    Alcohol use: No    Drug use: No    Sexual activity: Never   Other Topics Concern    Not on file   Social History Narrative    Lives with mom and older brother, who also has spina bifida    Mom homeschooled his last few years; he earned high school diploma     Social Determinants of Health     Financial Resource Strain:     Difficulty of Paying Living Expenses:    Food Insecurity:     Worried About Running Out of Food in the Last Year:     [address] in the Last Year:    Transportation Needs:     Lack of Transportation (Medical):  Lack of Transportation (Non-Medical):    Physical Activity:     Days of Exercise per Week:     Minutes of Exercise per Session:    Stress:     Feeling of Stress :    Social Connections:     Frequency of Communication with Friends and Family:     Frequency of Social Gatherings with Friends and Family:     Attends [employer] Services:     Active Member of Clubs or Organizations:     Attends Club or Organization Meetings:     Marital Status:    Intimate Partner Violence:     Fear of Current or Ex-Partner:     Emotionally Abused:     Physically Abused:     Sexually Abused:          OBJECTIVE    Visit Vitals  /77 (BP 1 Location: Left upper arm, BP Patient Position: Sitting, BP Cuff Size: Adult)   Pulse (!) 105   Temp 99.2 °F (37.3 °C) (Temporal)   Resp 18   Ht 5' 2\" (1.575 m)   Wt 131 lb (59.4 kg) Comment: Patient reported   SpO2 98%   BMI 23.96 kg/m²       Physical Exam  Vitals and nursing note reviewed. Constitutional:       General: He is not in acute distress. Appearance: He is not diaphoretic. Comments: In wheelchair   Eyes:      Conjunctiva/sclera: Conjunctivae normal.      Pupils: Pupils are equal, round, and reactive to light. Cardiovascular:      Rate and Rhythm: Normal rate and regular rhythm. Heart sounds: Normal heart sounds. No murmur heard. No friction rub. No gallop. Pulmonary:      Effort: Pulmonary effort is normal. No respiratory distress. Breath sounds: Normal breath sounds. No wheezing. Musculoskeletal:      Comments: Limited extension of both legs due to contractures   Skin:     General: Skin is warm and dry. Neurological:      Mental Status: He is alert. ASSESSMENT AND PLAN  Enio Ríos is a 19 y.o. male who presents today for:    1. Encounter to establish care with new doctor    2. Vitamin D deficiency  Will switch to drops. - Cholecalciferol, Vitamin D3, 50 mcg/drop (2, 000 unit/drop) drop; Take 2,000 Units by mouth daily. Dispense: 30 mL; Refill: 1    3. Spina bifida of lumbar region with hydrocephalus ([facility]  Stable, continue current treatment. 4. Gastroesophageal reflux disease, unspecified whether esophagitis present  Advised diet modification and can use prn Tums/Pepcid. Consider GI referral if persistent. Medications Discontinued During This Encounter   Medication Reason    cholecalciferol (VITAMIN D3) (1000 Units /25 mcg) tablet LIST CLEANUP    nystatin (MYCOSTATIN) 100,000 unit/mL suspension LIST CLEANUP    OTHER LIST CLEANUP    Cholecalciferol, Vitamin D3, 50 mcg/drop (2, 000 unit/drop) drop REORDER          Follow-up and Dispositions    · Return in about 6 months (around 11/24/2021) for Medication Check. Treatment risks/benefits/costs/interactions/alternatives discussed with patient. Advised patient to call back or return to office if symptoms worsen/change/persist. If patient cannot reach us or should anything more severe/urgent arise he/she should proceed directly to the nearest emergency department. Discussed expected course/resolution/complications of diagnosis in detail with patient. Patient expressed understanding with the diagnosis and plan. Gabriel Ventura M.D.

## 2021-05-24 NOTE — PATIENT INSTRUCTIONS
Gastroesophageal Reflux Disease (GERD): Care Instructions  Overview     Gastroesophageal reflux disease (GERD) is the backward flow of stomach acid into the esophagus. The esophagus is the tube that leads from your throat to your stomach. A one-way valve prevents the stomach acid from backing up into this tube. But when you have GERD, this valve does not close tightly enough. This can also cause pain and swelling in your esophagus. (This is called esophagitis.)  If you have mild GERD symptoms including heartburn, you may be able to control the problem with antacids or over-the-counter medicine. You can also make lifestyle changes to help reduce your symptoms. These include changing your diet and eating habits, such as not eating late at night and losing weight. Follow-up care is a key part of your treatment and safety. Be sure to make and go to all appointments, and call your doctor if you are having problems. It's also a good idea to know your test results and keep a list of the medicines you take. How can you care for yourself at home? · Take your medicines exactly as prescribed. Call your doctor if you think you are having a problem with your medicine. · Your doctor may recommend over-the-counter medicine. For mild or occasional indigestion, antacids, such as Tums, Gaviscon, Mylanta, or Maalox, may help. Your doctor also may recommend over-the-counter acid reducers, such as famotidine (Pepcid AC), cimetidine (Tagamet HB), or omeprazole (Prilosec). Read and follow all instructions on the label. If you use these medicines often, talk with your doctor. · Change your eating habits. ? It's best to eat several small meals instead of two or three large meals. ? After you eat, wait 2 to 3 hours before you lie down. ? Chocolate, mint, and alcohol can make GERD worse. ? Spicy foods, foods that have a lot of acid (like tomatoes and oranges), and coffee can make GERD symptoms worse in some people.  If your symptoms are worse after you eat a certain food, you may want to stop eating that food to see if your symptoms get better. · Do not smoke or chew tobacco. Smoking can make GERD worse. If you need help quitting, talk to your doctor about stop-smoking programs and medicines. These can increase your chances of quitting for good. · If you have GERD symptoms at night, raise the head of your bed 6 to 8 inches by putting the frame on blocks or placing a foam wedge under the head of your mattress. (Adding extra pillows does not work.)  · Do not wear tight clothing around your middle. · Lose weight if you need to. Losing just 5 to 10 pounds can help. When should you call for help? Call your doctor now or seek immediate medical care if:    · You have new or different belly pain.     · Your stools are black and tarlike or have streaks of blood. Watch closely for changes in your health, and be sure to contact your doctor if:    · Your symptoms have not improved after 2 days.     · Food seems to catch in your throat or chest.   Where can you learn more? Go to http://www.gray.com/  Enter E128 in the search box to learn more about \"Gastroesophageal Reflux Disease (GERD): Care Instructions. \"  Current as of: April 15, 2020               Content Version: 12.8  © 2006-2021 [employer]. Care instructions adapted under license by [employer] (which disclaims liability or warranty for this information). If you have questions about a medical condition or this instruction, always ask your healthcare professional. [place] [address] any warranty or liability for your use of this information.

## 2021-07-14 ENCOUNTER — TELEPHONE (OUTPATIENT)
Dept: FAMILY MEDICINE CLINIC | Age: 21
End: 2021-07-14

## 2021-07-14 NOTE — TELEPHONE ENCOUNTER
Outbound call to Cailin at [facility], was unable to   reach to advise that the items requested were already faxed over but will go ahead and refax, left a voicemail for a callback.

## 2021-07-14 NOTE — TELEPHONE ENCOUNTER
----- Message from April M Rohini Grady sent at 7/14/2021 11:01 AM EDT -----  Regarding: Dr. Rupal Mendes Message/Vendor Calls    Caller's first and last name: Mariana Cooley from [facility]       Reason for call: Requested a call back       Callback required yes/no and why: Yes       Best contact number(s): [phone] ext [phone]       Details to clarify the request: Jenniferphan Yasir is following up on a request for a tyrel wheelchair she faxed on July 7th and would like to know if the practice has received it.        April [address] [facility] [place]

## 2021-07-21 ENCOUNTER — TELEPHONE (OUTPATIENT)
Dept: FAMILY MEDICINE CLINIC | Age: 21
End: 2021-07-21

## 2021-07-21 NOTE — TELEPHONE ENCOUNTER
Angel Barnhart states she didn't received he paperwork back for the patients manual wheelchair. It was faxed on July 7,2021. Please to fax back to [phone].      Best contact number(s):[phone] ext [phone]

## 2021-07-28 NOTE — TELEPHONE ENCOUNTER
Sylvie called again stating she didn't receive anything.  She gave me a new fax number [phone]    rx for manual wheelchair is discontinued so I couldn't fax it myself

## 2021-08-04 ENCOUNTER — TELEPHONE (OUTPATIENT)
Dept: FAMILY MEDICINE CLINIC | Age: 21
End: 2021-08-04

## 2021-08-04 NOTE — TELEPHONE ENCOUNTER
Kelley Cuadra is going to refax the order request to [phone]. Tasha Brown is going to put both her fax numbers on the cover sheet, and she would like that completed and faxed back as soon as possible.  This is in reference to the patient's wheelchair request.     Best contact number(s):  [phone], ext. [phone]

## 2021-08-11 ENCOUNTER — TELEPHONE (OUTPATIENT)
Dept: FAMILY MEDICINE CLINIC | Age: 21
End: 2021-08-11

## 2021-08-11 NOTE — TELEPHONE ENCOUNTER
:following up on fax that was resent on August 4,2021 for a manual wheelchair.  Please advise    Best contact number(s):[phone] ext [phone]

## 2021-08-13 NOTE — TELEPHONE ENCOUNTER
Chief Complaint   Patient presents with    Other    Form Completion     Forms completed and signed by Dr. Rosas Tavares . Forms faxed to [facility] at [phone] with confirmation received.

## 2022-03-18 PROBLEM — J45.20 MILD INTERMITTENT ASTHMA WITHOUT COMPLICATION: Status: ACTIVE | Noted: 2017-02-23

## 2022-03-18 PROBLEM — E55.9 VITAMIN D DEFICIENCY: Status: ACTIVE | Noted: 2017-07-28

## 2022-03-19 PROBLEM — N31.9 NEUROGENIC BLADDER: Status: ACTIVE | Noted: 2017-07-28

## 2022-03-19 PROBLEM — R32 URINARY INCONTINENCE: Status: ACTIVE | Noted: 2017-07-28

## 2022-03-19 PROBLEM — Z78.9 SELF-CATHETERIZES URINARY BLADDER: Status: ACTIVE | Noted: 2017-07-28

## 2022-03-20 PROBLEM — F82 MOTOR DEVELOPMENTAL DELAY: Status: ACTIVE | Noted: 2018-08-16

## 2022-03-20 PROBLEM — Z98.2 VP (VENTRICULOPERITONEAL) SHUNT STATUS: Status: ACTIVE | Noted: 2017-07-28

## 2022-03-20 PROBLEM — M24.569 FLEXION CONTRACTURE OF KNEE: Status: ACTIVE | Noted: 2017-07-28

## 2022-09-22 ENCOUNTER — OFFICE VISIT (OUTPATIENT)
Dept: FAMILY MEDICINE CLINIC | Age: 22
End: 2022-09-22
Payer: COMMERCIAL

## 2022-09-22 VITALS
OXYGEN SATURATION: 99 % | HEART RATE: 88 BPM | TEMPERATURE: 97.9 F | RESPIRATION RATE: 16 BRPM | DIASTOLIC BLOOD PRESSURE: 76 MMHG | SYSTOLIC BLOOD PRESSURE: 121 MMHG

## 2022-09-22 DIAGNOSIS — Z00.00 ROUTINE GENERAL MEDICAL EXAMINATION AT HEALTH CARE FACILITY: Primary | ICD-10-CM

## 2022-09-22 DIAGNOSIS — Z13.1 SCREENING FOR DIABETES MELLITUS: ICD-10-CM

## 2022-09-22 DIAGNOSIS — L74.9 SWEATING ABNORMALITY: ICD-10-CM

## 2022-09-22 DIAGNOSIS — K21.9 GASTROESOPHAGEAL REFLUX DISEASE, UNSPECIFIED WHETHER ESOPHAGITIS PRESENT: ICD-10-CM

## 2022-09-22 DIAGNOSIS — E55.9 VITAMIN D DEFICIENCY: ICD-10-CM

## 2022-09-22 DIAGNOSIS — Z13.220 SCREENING FOR LIPID DISORDERS: ICD-10-CM

## 2022-09-22 DIAGNOSIS — J45.20 MILD INTERMITTENT ASTHMA WITHOUT COMPLICATION: ICD-10-CM

## 2022-09-22 PROCEDURE — 99214 OFFICE O/P EST MOD 30 MIN: CPT | Performed by: FAMILY MEDICINE

## 2022-09-22 PROCEDURE — 99395 PREV VISIT EST AGE 18-39: CPT | Performed by: FAMILY MEDICINE

## 2022-09-22 RX ORDER — ALBUTEROL SULFATE 90 UG/1
2 AEROSOL, METERED RESPIRATORY (INHALATION)
Qty: 1 EACH | Refills: 3 | Status: SHIPPED | OUTPATIENT
Start: 2022-09-22 | End: 2022-09-22

## 2022-09-22 RX ORDER — ALBUTEROL SULFATE 90 UG/1
2 AEROSOL, METERED RESPIRATORY (INHALATION)
Qty: 1 EACH | Refills: 3 | Status: SHIPPED | OUTPATIENT
Start: 2022-09-22

## 2022-09-22 NOTE — PROGRESS NOTES
Patient Name: Gianna Arreola   MRN: [id number]    Mayuri Little is a 23 y.o. male who presents with the following:     Reports worsening acid reflux over the past 1.5 years. Using lots of Tums. Seems to be constant and worse at night. Also notices some head sweating at night. Cannot swallow pills. Not currently taking vitamin D; hx of vitamin D deficiency. Uses prn albuterol, mostly before exercise. Review of Systems   Constitutional:  Negative for fever, malaise/fatigue and weight loss. Respiratory:  Negative for cough, hemoptysis, shortness of breath and wheezing. Cardiovascular:  Negative for chest pain, palpitations, leg swelling and PND. Gastrointestinal:  Positive for heartburn. Negative for abdominal pain, constipation, diarrhea, nausea and vomiting. The patient's medications, allergies, past medical history, surgical history, family history and social history were reviewed and updated where appropriate. Current Outpatient Medications:     albuterol (PROVENTIL HFA, VENTOLIN HFA, PROAIR HFA) 90 mcg/actuation inhaler, Take 2 Puffs by inhalation every six (6) hours as needed for Wheezing. 2 Puffs Q4 hours scheduled x 48 hours then Q4 hours PRN, Disp: 1 Each, Rfl: 3    Allergies   Allergen Reactions    Latex Hives    Banana Other (comments)     ASSUMED DUE TO LATEX ALLERGY    Kiwi Other (comments)     ASSUMED DUE TO LATEX ALLERGY    Pillow Other (comments)     ASSUMED DUE TO LATEX ALLERGY     OBJECTIVE    Visit Vitals  /76 (BP 1 Location: Left arm, BP Patient Position: Sitting, BP Cuff Size: Adult)   Pulse 88   Temp 97.9 °F (36.6 °C) (Temporal)   Resp 16   SpO2 99%       Physical Exam  Vitals and nursing note reviewed. Constitutional:       General: He is not in acute distress. Appearance: He is not diaphoretic. Eyes:      Conjunctiva/sclera: Conjunctivae normal.      Pupils: Pupils are equal, round, and reactive to light.    Cardiovascular:      Rate and Rhythm: Normal rate and regular rhythm. Heart sounds: Normal heart sounds. No murmur heard. No friction rub. No gallop. Pulmonary:      Effort: Pulmonary effort is normal. No respiratory distress. Breath sounds: Normal breath sounds. No wheezing. Skin:     General: Skin is warm and dry. Neurological:      Mental Status: He is alert. ASSESSMENT AND PLAN  Sera Matthews is a 23 y.o. male who presents today for:    1. Routine general medical examination at health care facility  Reviewed age appropriate screening tests as recommended by the [place]PSTF Preventive Services Database with patient today. 2. Gastroesophageal reflux disease, unspecified whether esophagitis present  Recommend OTC Pepcid chewables and establish with GI for likely EGD. 3. Vitamin D deficiency  - VITAMIN D, 25 HYDROXY; Future  - VITAMIN D, 25 HYDROXY    4. Sweating abnormality  Possibly due to discomfort from nightly GERD; will evaluate thyroid levels. - TSH 3RD GENERATION; Future  - T4 (THYROXINE); Future  - T4 (THYROXINE)  - TSH 3RD GENERATION    5. Screening for lipid disorders  - CBC W/O DIFF; Future  - METABOLIC PANEL, COMPREHENSIVE; Future  - LIPID PANEL; Future  - LIPID PANEL  - METABOLIC PANEL, COMPREHENSIVE  - CBC W/O DIFF    6. Screening for diabetes mellitus  - HEMOGLOBIN A1C WITH EAG; Future  - HEMOGLOBIN A1C WITH EAG    7. Mild intermittent asthma without complication  - albuterol (PROVENTIL HFA, VENTOLIN HFA, PROAIR HFA) 90 mcg/actuation inhaler; Take 2 Puffs by inhalation every six (6) hours as needed for Wheezing.   Dispense: 1 Each; Refill: 3       Medications Discontinued During This Encounter   Medication Reason    TOVIAZ 8 mg ER tablet LIST CLEANUP    Cholecalciferol, Vitamin D3, 50 mcg/drop (2, 000 unit/drop) drop LIST CLEANUP    albuterol (PROVENTIL HFA, VENTOLIN HFA, PROAIR HFA) 90 mcg/actuation inhaler REORDER    budesonide (PULMICORT) 180 mcg/actuation aepb inhaler LIST CLEANUP    albuterol (PROVENTIL HFA, VENTOLIN HFA, PROAIR HFA) 90 mcg/actuation inhaler LIST CLEANUP           Treatment risks/benefits/costs/interactions/alternatives discussed with patient. Advised patient to call back or return to office if symptoms worsen/change/persist. If patient cannot reach us or should anything more severe/urgent arise he/she should proceed directly to the nearest emergency department. Discussed expected course/resolution/complications of diagnosis in detail with patient. Patient expressed understanding with the diagnosis and plan. This dictation may have been completed with Dragon, the YoBucko voice recognition software. Unanticipated grammatical, syntax, homophones, and other interpretive errors are sometimes inadvertently transcribed by the computer software. Please disregard any errors that have escaped final proofreading. Gabriel Renner M.D.

## 2022-09-22 NOTE — PATIENT INSTRUCTIONS
Please call one of the offices to schedule your colonoscopy/GI needs:    [facility] [address] [facility] [address]  [phone]    [address] [place] [address] [facility]  [address] [place] [address]  [address], [facility]  [phone]

## 2022-09-22 NOTE — PROGRESS NOTES
Chief Complaint   Patient presents with    Medication Refill     Albutero         1. \"Have you been to the ER, urgent care clinic since your last visit? Hospitalized since your last visit? \" No    2. \"Have you seen or consulted any other health care providers outside of the [address] [place] [address] since your last visit? \"    NO  3. For patients aged 39-70: Has the patient had a colonoscopy / FIT/ Cologuard? NA - based on age      If the patient is female:    4. For patients aged 41-77: Has the patient had a mammogram within the past 2 years? NA - based on age or sex      11. For patients aged 21-65: Has the patient had a pap smear?  NA - based on age or sex         3 most recent PHQ Screens 9/22/2022   Little interest or pleasure in doing things Not at all   Feeling down, depressed, irritable, or hopeless Not at all   Total Score PHQ 2 0   Trouble falling or staying asleep, or sleeping too much Not at all   Feeling tired or having little energy Not at all   Poor appetite, weight loss, or overeating Not at all   Feeling bad about yourself - or that you are a failure or have let yourself or your family down Not at all   Trouble concentrating on things such as school, work, reading, or watching TV Not at all   Moving or speaking so slowly that other people could have noticed; or the opposite being so fidgety that others notice Not at all   Thoughts of being better off dead, or hurting yourself in some way Not at all   PHQ 9 Score 0   How difficult have these problems made it for you to do your work, take care of your home and get along with others Not difficult at all       Health Maintenance Due   Topic Date Due    DTaP/Tdap/Td series (7 - Td or Tdap) 07/14/2020    Depression Screen  05/24/2022    Flu Vaccine (1) Never done      PT declined Flu Vaccine

## 2022-09-23 LAB
25(OH)D3 SERPL-MCNC: 12.5 NG/ML (ref 30–100)
ALBUMIN SERPL-MCNC: 3.7 G/DL (ref 3.5–5)
ALBUMIN/GLOB SERPL: 1.2 {RATIO} (ref 1.1–2.2)
ALP SERPL-CCNC: 79 U/L (ref 45–117)
ALT SERPL-CCNC: 35 U/L (ref 12–78)
ANION GAP SERPL CALC-SCNC: 5 MMOL/L (ref 5–15)
AST SERPL-CCNC: 12 U/L (ref 15–37)
BILIRUB SERPL-MCNC: 0.6 MG/DL (ref 0.2–1)
BUN SERPL-MCNC: 9 MG/DL (ref 6–20)
BUN/CREAT SERPL: 12 (ref 12–20)
CALCIUM SERPL-MCNC: 9.3 MG/DL (ref 8.5–10.1)
CHLORIDE SERPL-SCNC: 106 MMOL/L (ref 97–108)
CHOLEST SERPL-MCNC: 168 MG/DL
CO2 SERPL-SCNC: 30 MMOL/L (ref 21–32)
CREAT SERPL-MCNC: 0.78 MG/DL (ref 0.7–1.3)
ERYTHROCYTE [DISTWIDTH] IN BLOOD BY AUTOMATED COUNT: 13.8 % (ref 11.5–14.5)
EST. AVERAGE GLUCOSE BLD GHB EST-MCNC: 103 MG/DL
GLOBULIN SER CALC-MCNC: 3.1 G/DL (ref 2–4)
GLUCOSE SERPL-MCNC: 94 MG/DL (ref 65–100)
HBA1C MFR BLD: 5.2 % (ref 4–5.6)
HCT VFR BLD AUTO: 46.6 % (ref 36.6–50.3)
HDLC SERPL-MCNC: 42 MG/DL
HDLC SERPL: 4 {RATIO} (ref 0–5)
HGB BLD-MCNC: 14.9 G/DL (ref 12.1–17)
LDLC SERPL CALC-MCNC: 92.6 MG/DL (ref 0–100)
MCH RBC QN AUTO: 27.7 PG (ref 26–34)
MCHC RBC AUTO-ENTMCNC: 32 G/DL (ref 30–36.5)
MCV RBC AUTO: 86.6 FL (ref 80–99)
NRBC # BLD: 0 K/UL (ref 0–0.01)
NRBC BLD-RTO: 0 PER 100 WBC
PLATELET # BLD AUTO: 271 K/UL (ref 150–400)
PMV BLD AUTO: 10.6 FL (ref 8.9–12.9)
POTASSIUM SERPL-SCNC: 4.6 MMOL/L (ref 3.5–5.1)
PROT SERPL-MCNC: 6.8 G/DL (ref 6.4–8.2)
RBC # BLD AUTO: 5.38 M/UL (ref 4.1–5.7)
SODIUM SERPL-SCNC: 141 MMOL/L (ref 136–145)
T4 SERPL-MCNC: 13 UG/DL (ref 4.5–12.1)
TRIGL SERPL-MCNC: 167 MG/DL (ref ?–150)
TSH SERPL DL<=0.05 MIU/L-ACNC: 2.08 UIU/ML (ref 0.36–3.74)
VLDLC SERPL CALC-MCNC: 33.4 MG/DL
WBC # BLD AUTO: 5.9 K/UL (ref 4.1–11.1)

## 2023-05-17 RX ORDER — ALBUTEROL SULFATE 90 UG/1
2 AEROSOL, METERED RESPIRATORY (INHALATION) EVERY 6 HOURS PRN
COMMUNITY
Start: 2022-09-22

## 2024-01-19 ENCOUNTER — TELEPHONE (OUTPATIENT)
Age: 24
End: 2024-01-19

## 2024-01-19 NOTE — TELEPHONE ENCOUNTER
[facility] please inform him that  would be willing to fill out the DME Order's,however the pt need's to make an IN Office Appt.

## 2024-01-19 NOTE — TELEPHONE ENCOUNTER
----- Message from Dwayne Rodriguez MD sent at 1/18/2024 12:58 PM EST -----  Recently received DME orders on behalf of patient.  I will sign it but he needs to be seen in office as he has not been seen since 9/22.

## 2024-03-08 SDOH — ECONOMIC STABILITY: INCOME INSECURITY: HOW HARD IS IT FOR YOU TO PAY FOR THE VERY BASICS LIKE FOOD, HOUSING, MEDICAL CARE, AND HEATING?: NOT VERY HARD

## 2024-03-08 SDOH — ECONOMIC STABILITY: HOUSING INSECURITY
IN THE LAST 12 MONTHS, WAS THERE A TIME WHEN YOU DID NOT HAVE A STEADY PLACE TO SLEEP OR SLEPT IN A SHELTER (INCLUDING NOW)?: NO

## 2024-03-08 SDOH — ECONOMIC STABILITY: FOOD INSECURITY: WITHIN THE PAST 12 MONTHS, THE FOOD YOU BOUGHT JUST DIDN'T LAST AND YOU DIDN'T HAVE MONEY TO GET MORE.: NEVER TRUE

## 2024-03-08 SDOH — ECONOMIC STABILITY: TRANSPORTATION INSECURITY
IN THE PAST 12 MONTHS, HAS LACK OF TRANSPORTATION KEPT YOU FROM MEETINGS, WORK, OR FROM GETTING THINGS NEEDED FOR DAILY LIVING?: NO

## 2024-03-08 SDOH — ECONOMIC STABILITY: FOOD INSECURITY: WITHIN THE PAST 12 MONTHS, YOU WORRIED THAT YOUR FOOD WOULD RUN OUT BEFORE YOU GOT MONEY TO BUY MORE.: NEVER TRUE

## 2024-03-11 ENCOUNTER — OFFICE VISIT (OUTPATIENT)
Age: 24
End: 2024-03-11
Payer: COMMERCIAL

## 2024-03-11 VITALS
SYSTOLIC BLOOD PRESSURE: 124 MMHG | HEART RATE: 80 BPM | TEMPERATURE: 99.5 F | OXYGEN SATURATION: 99 % | DIASTOLIC BLOOD PRESSURE: 72 MMHG | RESPIRATION RATE: 14 BRPM

## 2024-03-11 DIAGNOSIS — Z13.1 SCREENING FOR DIABETES MELLITUS: ICD-10-CM

## 2024-03-11 DIAGNOSIS — Z23 ENCOUNTER FOR IMMUNIZATION: ICD-10-CM

## 2024-03-11 DIAGNOSIS — E55.9 VITAMIN D DEFICIENCY, UNSPECIFIED: ICD-10-CM

## 2024-03-11 DIAGNOSIS — Z02.89 ENCOUNTER FOR COMPLETION OF FORM WITH PATIENT: ICD-10-CM

## 2024-03-11 DIAGNOSIS — Z13.220 SCREENING FOR LIPID DISORDERS: ICD-10-CM

## 2024-03-11 DIAGNOSIS — Z00.00 ROUTINE GENERAL MEDICAL EXAMINATION AT HEALTH CARE FACILITY: Primary | ICD-10-CM

## 2024-03-11 PROCEDURE — 99395 PREV VISIT EST AGE 18-39: CPT | Performed by: FAMILY MEDICINE

## 2024-03-11 PROCEDURE — 90471 IMMUNIZATION ADMIN: CPT | Performed by: FAMILY MEDICINE

## 2024-03-11 PROCEDURE — 90674 CCIIV4 VAC NO PRSV 0.5 ML IM: CPT | Performed by: FAMILY MEDICINE

## 2024-03-11 PROCEDURE — 90715 TDAP VACCINE 7 YRS/> IM: CPT | Performed by: FAMILY MEDICINE

## 2024-03-11 PROCEDURE — 90472 IMMUNIZATION ADMIN EACH ADD: CPT | Performed by: FAMILY MEDICINE

## 2024-03-11 RX ORDER — PANTOPRAZOLE SODIUM 40 MG/1
40 TABLET, DELAYED RELEASE ORAL DAILY
COMMUNITY

## 2024-03-11 ASSESSMENT — PATIENT HEALTH QUESTIONNAIRE - PHQ9
SUM OF ALL RESPONSES TO PHQ QUESTIONS 1-9: 0
2. FEELING DOWN, DEPRESSED OR HOPELESS: 0
SUM OF ALL RESPONSES TO PHQ QUESTIONS 1-9: 0
1. LITTLE INTEREST OR PLEASURE IN DOING THINGS: 0
SUM OF ALL RESPONSES TO PHQ9 QUESTIONS 1 & 2: 0

## 2024-03-11 ASSESSMENT — ENCOUNTER SYMPTOMS
NAUSEA: 0
COUGH: 0
ABDOMINAL PAIN: 0
SHORTNESS OF BREATH: 0
VOMITING: 0
DIARRHEA: 0
CONSTIPATION: 0
CHEST TIGHTNESS: 0
WHEEZING: 0

## 2024-03-11 NOTE — PROGRESS NOTES
Chief Complaint   Patient presents with    Other     Paperwork from insurance      \"Have you been to the ER, urgent care clinic since your last visit?  Hospitalized since your last visit?\"    NO    “Have you seen or consulted any other health care providers outside of [facility] since your last visit?”    NO     Financial Resource Strain: Low Risk  (3/8/2024)    Overall Financial Resource Strain (CARDIA)     Difficulty of Paying Living Expenses: Not very hard      Food Insecurity: No Food Insecurity (3/8/2024)    Hunger Vital Sign     Worried About Running Out of Food in the Last Year: Never true     Ran Out of Food in the Last Year: Never true            3/11/2024     2:46 PM   PHQ-9    Little interest or pleasure in doing things 0   Feeling down, depressed, or hopeless 0   PHQ-2 Score 0   PHQ-9 Total Score 0       Health Maintenance Due   Topic Date Due    HPV vaccine (1 - Male 2-dose series) Never done    Pneumococcal 0-64 years Vaccine (2 - PCV) 06/18/2020    DTaP/Tdap/Td vaccine (7 - Td or Tdap) 07/14/2020    Flu vaccine (1) Never done    COVID-19 Vaccine (4 - 2023-24 season) 09/01/2023    Depression Screen  09/22/2023

## 2024-03-11 NOTE — PROGRESS NOTES
Patient Name: Conrad Harper   MRN: [id number]    SUBJECTIVE  Conrad Harper is a 23 y.o. male who presents with the following: here with father    CAD risk factors:  HTN: wnl  BP Readings from Last 3 Encounters:   03/11/24 124/72   09/22/22 121/76   05/24/21 127/77     Lipid: due  Lab Results   Component Value Date    CHOL 168 09/22/2022    TRIG 167 (H) 09/22/2022    HDL 42 09/22/2022    LDLCALC 92.6 09/22/2022    CHOLHDLRATIO 4.0 09/22/2022     DM: due  Hemoglobin A1C   Date Value Ref Range Status   09/22/2022 5.2 4.0 - 5.6 % Final     Comment:     NEW METHOD  PLEASE NOTE NEW REFERENCE RANGE  (NOTE)  HbA1C Interpretive Ranges  <5.7              Normal  5.7 - 6.4         Consider Prediabetes  >6.5              Consider Diabetes         Wt Readings from Last 3 Encounters:   05/24/21 59.4 kg (131 lb)   10/19/15 52.6 kg (116 lb) (6 %, Z= -1.60)*   08/12/15 52.9 kg (116 lb 10 oz) (7 %, Z= -1.49)*     * Growth percentiles are based on CDC (Boys, 2-20 Years) data.     Needs a form from insurance to verify that he has a physical impairment and is wheelchair dependent due to spina bifida.    Review of Systems   Constitutional:  Negative for activity change, appetite change, fatigue, fever and unexpected weight change.   Respiratory:  Negative for cough, chest tightness, shortness of breath and wheezing.    Cardiovascular:  Negative for chest pain, palpitations and leg swelling.   Gastrointestinal:  Negative for abdominal pain, constipation, diarrhea, nausea and vomiting.   Genitourinary:  Negative for dysuria, frequency and urgency.   Skin:  Negative for rash.   Neurological:  Negative for dizziness, weakness and headaches.   Psychiatric/Behavioral:  Negative for dysphoric mood and suicidal ideas. The patient is not nervous/anxious.    All other systems reviewed and are negative.        The patient's medications, allergies, past medical history, surgical history, family history and social history were reviewed and

## 2024-03-12 ENCOUNTER — TELEPHONE (OUTPATIENT)
Age: 24
End: 2024-03-12

## 2024-03-12 NOTE — TELEPHONE ENCOUNTER
Home care delivery is calling to check on forms that was sent over for patient supplies.   [facility]# : [phone]

## 2024-03-13 LAB
25(OH)D3 SERPL-MCNC: <9 NG/ML (ref 30–100)
ALBUMIN SERPL-MCNC: 3.9 G/DL (ref 3.5–5)
ALBUMIN/GLOB SERPL: 1.2 (ref 1.1–2.2)
ALP SERPL-CCNC: 95 U/L (ref 45–117)
ALT SERPL-CCNC: 50 U/L (ref 12–78)
ANION GAP SERPL CALC-SCNC: 1 MMOL/L (ref 5–15)
AST SERPL-CCNC: 21 U/L (ref 15–37)
BILIRUB SERPL-MCNC: 0.6 MG/DL (ref 0.2–1)
BUN SERPL-MCNC: 11 MG/DL (ref 6–20)
BUN/CREAT SERPL: 15 (ref 12–20)
CALCIUM SERPL-MCNC: 9.3 MG/DL (ref 8.5–10.1)
CHLORIDE SERPL-SCNC: 108 MMOL/L (ref 97–108)
CHOLEST SERPL-MCNC: 156 MG/DL
CO2 SERPL-SCNC: 29 MMOL/L (ref 21–32)
CREAT SERPL-MCNC: 0.74 MG/DL (ref 0.7–1.3)
ERYTHROCYTE [DISTWIDTH] IN BLOOD BY AUTOMATED COUNT: 13.6 % (ref 11.5–14.5)
EST. AVERAGE GLUCOSE BLD GHB EST-MCNC: 97 MG/DL
GLOBULIN SER CALC-MCNC: 3.2 G/DL (ref 2–4)
GLUCOSE SERPL-MCNC: 104 MG/DL (ref 65–100)
HBA1C MFR BLD: 5 % (ref 4–5.6)
HCT VFR BLD AUTO: 43.4 % (ref 36.6–50.3)
HDLC SERPL-MCNC: 47 MG/DL
HDLC SERPL: 3.3 (ref 0–5)
HGB BLD-MCNC: 13.6 G/DL (ref 12.1–17)
LDLC SERPL CALC-MCNC: 92 MG/DL (ref 0–100)
MCH RBC QN AUTO: 25.2 PG (ref 26–34)
MCHC RBC AUTO-ENTMCNC: 31.3 G/DL (ref 30–36.5)
MCV RBC AUTO: 80.5 FL (ref 80–99)
NRBC # BLD: 0 K/UL (ref 0–0.01)
NRBC BLD-RTO: 0 PER 100 WBC
PLATELET # BLD AUTO: 307 K/UL (ref 150–400)
PMV BLD AUTO: 10.9 FL (ref 8.9–12.9)
POTASSIUM SERPL-SCNC: 5 MMOL/L (ref 3.5–5.1)
PROT SERPL-MCNC: 7.1 G/DL (ref 6.4–8.2)
RBC # BLD AUTO: 5.39 M/UL (ref 4.1–5.7)
SODIUM SERPL-SCNC: 138 MMOL/L (ref 136–145)
TRIGL SERPL-MCNC: 85 MG/DL
VLDLC SERPL CALC-MCNC: 17 MG/DL
WBC # BLD AUTO: 8.1 K/UL (ref 4.1–11.1)

## 2024-03-13 RX ORDER — ERGOCALCIFEROL 1.25 MG/1
50000 CAPSULE ORAL WEEKLY
Qty: 8 CAPSULE | Refills: 0 | Status: SHIPPED | OUTPATIENT
Start: 2024-03-13

## 2024-06-28 ENCOUNTER — TELEPHONE (OUTPATIENT)
Age: 24
End: 2024-06-28

## 2024-06-28 ENCOUNTER — TELEMEDICINE (OUTPATIENT)
Age: 24
End: 2024-06-28
Payer: COMMERCIAL

## 2024-06-28 DIAGNOSIS — D73.4 CYST OF SPLEEN: Primary | ICD-10-CM

## 2024-06-28 DIAGNOSIS — J90 PLEURAL EFFUSION ON RIGHT: ICD-10-CM

## 2024-06-28 PROCEDURE — 99214 OFFICE O/P EST MOD 30 MIN: CPT | Performed by: FAMILY MEDICINE

## 2024-06-28 ASSESSMENT — ENCOUNTER SYMPTOMS
COUGH: 0
CHEST TIGHTNESS: 0
WHEEZING: 0
CONSTIPATION: 0
VOMITING: 0
NAUSEA: 0
DIARRHEA: 0
SHORTNESS OF BREATH: 0
ABDOMINAL PAIN: 0

## 2024-06-28 NOTE — TELEPHONE ENCOUNTER
----- Message from Zo Lord sent at 6/27/2024  1:22 PM EDT -----  Regarding: FW: E[facility] Appointment Request    ----- Message -----  From: Madelaine Mckenna  Sent: 6/27/2024  12:33 PM EDT  To: [facility] Clinical Staff  Subject: E[facility] Appointment Request                          E[facility] Appointment Request    Patient needs appointment for E[facility] Appointment Type: Existing Condition Follow Up.    Reason for Appointment Request: Available appointments did not meet patient need  --------------------------------------------------------------------------------------------------------------------------    Relationship to Patient: Guardian (Mother)     Call Back Information: OK to respond with electronic message via [employer] portal (only for patients who have registered [employer] account)      Preferred Call Back Number: Phone [phone]    Patient needs to see his doctor about the ultrasound that he performed. Patient needs an appointment as soon as possible

## 2024-06-28 NOTE — PROGRESS NOTES
Conrad Harper, was evaluated through a synchronous (real-time) audio-video encounter. The patient (or guardian if applicable) is aware that this is a billable service, which includes applicable co-pays. This Virtual Visit was conducted with patient's (and/or legal guardian's) consent. Patient identification was verified, and a caregiver was present when appropriate.   The patient was located at Home: [address] [place] [id number]  Provider was located at Facility (Appt Dept): [address] [place] [id number]  Confirm you are appropriately licensed, registered, or certified to deliver care in the state where the patient is located as indicated above. If you are not or unsure, please re-schedule the visit: Yes, I confirm.        Dwayne Rodriguez MD    Assessment & Plan:   Conrad Harper is a 24 y.o. male who presents today for:    1. Cyst of spleen  Recommend surgery evaluation to determine monitoring/management of new spleen cyst.    2. Pleural effusion on right  Recommend CXR to further evaluate.  - XR CHEST (2 VIEWS); Future      No orders of the defined types were placed in this encounter.       There are no discontinued medications.          Subjective:   Conrad Harper is a 24 y.o. male who was seen for: mother is present.    Recently had a ultrasound of his kidneys due to history of neurogenic bladder.  Incidental findings included a minimally complex cystic structure in the spleen and right pleural effusion.  Patient is asymptomatic.  He does have a  shunt.  Prior ultrasounds did not show spleen cyst but mild enlargement of the spleen.    [facility] US kidney complete 6/25/24:  1.  No nephrolithiasis or hydronephrosis   2.  Incompletely evaluated urinary bladder due to underdistention.   3.  Minimally complex cystic structure in the spleen   4.  Incidental Right pleural effusion       Current Outpatient Medications on File Prior to Visit   Medication Sig Dispense

## 2024-07-03 ENCOUNTER — TELEPHONE (OUTPATIENT)
Age: 24
End: 2024-07-03

## 2024-07-03 ENCOUNTER — OFFICE VISIT (OUTPATIENT)
Age: 24
End: 2024-07-03
Payer: COMMERCIAL

## 2024-07-03 VITALS
HEART RATE: 84 BPM | DIASTOLIC BLOOD PRESSURE: 83 MMHG | HEIGHT: 64 IN | OXYGEN SATURATION: 98 % | WEIGHT: 140 LBS | TEMPERATURE: 98 F | RESPIRATION RATE: 16 BRPM | BODY MASS INDEX: 23.9 KG/M2 | SYSTOLIC BLOOD PRESSURE: 132 MMHG

## 2024-07-03 DIAGNOSIS — D73.4 SPLENIC CYST: Primary | ICD-10-CM

## 2024-07-03 PROCEDURE — 99204 OFFICE O/P NEW MOD 45 MIN: CPT | Performed by: STUDENT IN AN ORGANIZED HEALTH CARE EDUCATION/TRAINING PROGRAM

## 2024-07-03 ASSESSMENT — PATIENT HEALTH QUESTIONNAIRE - PHQ9
SUM OF ALL RESPONSES TO PHQ QUESTIONS 1-9: 0
1. LITTLE INTEREST OR PLEASURE IN DOING THINGS: NOT AT ALL
SUM OF ALL RESPONSES TO PHQ9 QUESTIONS 1 & 2: 0
SUM OF ALL RESPONSES TO PHQ QUESTIONS 1-9: 0
2. FEELING DOWN, DEPRESSED OR HOPELESS: NOT AT ALL

## 2024-07-03 NOTE — TELEPHONE ENCOUNTER
Attempted to call patient informing him of the EMMANUEL form that was sent to his MyChart to be filled out and faxed back. LVM to return call if have any other questions.

## 2024-07-03 NOTE — PROGRESS NOTES
[facility]  Hepatobiliary Surgery  [address], [address] [place] [address]  W: [phone]  F: [phone]    Reason for Visit:   Conrad Harper is a 24 y.o. male who is seen in consultation at the request of Dr. Rodriguez for evaluation of a newly diagnosed splenic cyst.    History of Present Illness:   Conrad Harper is a pleasant 24 y.o. male who presents today for evaluation of a splenic cyst.     Mr. Nolasco has a significant medical history as listed below. He underwent an US kidney complete on 6/25/24 for a history of neurogenic bladder. Per mother, he gets an US every year.  This showed a \"small minimally complex anechoic/cystic structure demostrated within the spleen measures 1.7 cm long in greatest dimension.\" Unfortunately, I do not have access to these images today.  Prior US reports dating back to 2021 do not mention a splenic lesion or cyst.     The patient denies any significant history of abdominal pain. He notes some mild discomfort when leaning forward, however, this is intermittent and minimal. He denies a history of weight loss or a family history contributory to the splenic cyst.       Past Medical History:   Diagnosis Date    Chiari malformation     Flexion contracture of knee 7/28/2017    Hydrocephalus (HCC)     Mild intermittent asthma without complication 2/23/2017    Motor developmental delay 8/16/2018    Neurogenic bladder 7/28/2017    Self-catheterizes urinary bladder 7/28/2017    Spina bifida (HCC)     Urinary incontinence 7/28/2017    Vitamin D deficiency 7/28/2017     (ventriculoperitoneal) shunt status 7/28/2017     Past Surgical History:   Procedure Laterality Date    BACK SURGERY  LAST: 2006  3 SURGERIES    TETHERED CORD RELEASE    CSF SHUNT Left 2010    has had at least 16    ORTHOPEDIC SURGERY      bilateral knee flexion deformity release    SPINAL CORD DECOMPRESSION N/A 2004    Neck    UPPER GASTROINTESTINAL ENDOSCOPY  2023      Family

## 2024-07-03 NOTE — PROGRESS NOTES
Identified pt with two pt identifiers (name and ). Reviewed chart in preparation for visit and have obtained necessary documentation.    Conrad Harper is a 24 y.o. male  Chief Complaint   Patient presents with    New Patient     Cyst of spleen      /83 (Site: Left Upper Arm, Position: Sitting, Cuff Size: Large Adult)   Pulse 84   Temp 98 °F (36.7 °C) (Oral)   Resp 16   Ht 1.626 m (5' 4\")   Wt 63.5 kg (140 lb)   SpO2 98%   BMI 24.03 kg/m²     1. Have you been to the ER, urgent care clinic since your last visit?  Hospitalized since your last visit?no    2. Have you seen or consulted any other health care providers outside of the [facility] since your last visit?  Include any pap smears or colon screening. yes - [facility]

## 2024-07-22 ENCOUNTER — HOSPITAL ENCOUNTER (OUTPATIENT)
Facility: HOSPITAL | Age: 24
Discharge: HOME | End: 2024-07-25
Payer: COMMERCIAL

## 2024-07-22 DIAGNOSIS — J90 PLEURAL EFFUSION ON RIGHT: ICD-10-CM

## 2024-07-22 PROCEDURE — 71046 X-RAY EXAM CHEST 2 VIEWS: CPT
